# Patient Record
Sex: MALE | Race: BLACK OR AFRICAN AMERICAN | Employment: FULL TIME | URBAN - METROPOLITAN AREA
[De-identification: names, ages, dates, MRNs, and addresses within clinical notes are randomized per-mention and may not be internally consistent; named-entity substitution may affect disease eponyms.]

---

## 2020-05-07 ENCOUNTER — APPOINTMENT (OUTPATIENT)
Dept: GENERAL RADIOLOGY | Age: 35
End: 2020-05-07

## 2020-05-07 ENCOUNTER — HOSPITAL ENCOUNTER (EMERGENCY)
Age: 35
Discharge: HOME | End: 2020-05-07

## 2020-05-07 VITALS
SYSTOLIC BLOOD PRESSURE: 154 MMHG | OXYGEN SATURATION: 98 % | DIASTOLIC BLOOD PRESSURE: 88 MMHG | RESPIRATION RATE: 16 BRPM | HEART RATE: 88 BPM | TEMPERATURE: 97.1 F

## 2020-05-07 PROCEDURE — 99283 EMERGENCY DEPT VISIT LOW MDM: CPT

## 2020-05-07 PROCEDURE — 73562 X-RAY EXAM OF KNEE 3: CPT

## 2020-05-07 RX ORDER — NAPROXEN 500 MG/1
500 TABLET ORAL 2 TIMES DAILY
Qty: 20 TABLET | Refills: 0 | Status: SHIPPED | OUTPATIENT
Start: 2020-05-07 | End: 2021-10-05 | Stop reason: SINTOL

## 2020-05-07 ASSESSMENT — PAIN SCALES - GENERAL: PAINLEVEL_OUTOF10: 6

## 2020-05-08 ASSESSMENT — ENCOUNTER SYMPTOMS
ABDOMINAL PAIN: 0
VOMITING: 0
NAUSEA: 0
SHORTNESS OF BREATH: 0

## 2020-05-08 NOTE — ED PROVIDER NOTES
that surgical hardware is intact. No evidence of loosening or fracture. Patient was encouraged to follow-up with the surgeon that performed his original surgery in [place] however he was given orthopedic follow-up here in the [place] area if he deems he needs follow-up prior to going home. Patient was encouraged to use anti-inflammatories, rest and ice. He was given a few days off of work to rehabilitate his knee. Patient encouraged to follow-up with the Worker's Comp. clinic. He is amenable to this outpatient plan will be discharged home. I estimate there is LOW risk for FRACTURE, COMPARTMENT SYNDROME, DEEP VENOUS THROMBOSIS, SEPTIC ARTHRITIS, TENDON OR NEUROVASCULAR INJURY, thus I consider the discharge disposition reasonable. FINAL IMPRESSION      1. Acute pain of right knee    2.  Work related injury          [place] To Discharge 05/07/2020 11:23:10 PM      PATIENT REFERREDTO:  Cassie Cole MD  [facility], [address] [place] [address],[address]  [address] [place] [address]  [phone]    Schedule an appointment as soon as possible for a visit in 3 days  for re-evaluation    [facility] Emergency Department  [facility]  [phone]    If symptoms worsen    *[address] [place] [address] [address]  [phone]    Schedule an appointment as soon as possible for a visit in 3 days  for re-evaluation      DISCHARGE MEDICATIONS:  New Prescriptions    NAPROXEN (NAPROSYN) 500 MG TABLET    Take 1 tablet by mouth 2 times daily       DISCONTINUED MEDICATIONS:  Discontinued Medications    No medications on file              (Please note that portions of this note were completed with a voice recognition program.  Efforts were made to edit the dictations but occasionally words are mis-transcribed.)    Dora Soriano PA-C (electronically signed)            Dora Soriano PA-C  05/08/20 0142

## 2020-05-08 NOTE — ED NOTES
Called [facility] and spoke with Bennett Stevenson and stated his employer is not covered.      [employer]  05/07/20 7149

## 2021-05-17 PROBLEM — G47.30 SEVERE SLEEP APNEA: Status: ACTIVE | Noted: 2021-05-17

## 2021-11-11 PROBLEM — E66.01 MORBID OBESITY DUE TO EXCESS CALORIES (HCC): Status: ACTIVE | Noted: 2021-11-11
